# Patient Record
Sex: FEMALE
[De-identification: names, ages, dates, MRNs, and addresses within clinical notes are randomized per-mention and may not be internally consistent; named-entity substitution may affect disease eponyms.]

---

## 2022-05-06 PROBLEM — Z00.00 ENCOUNTER FOR PREVENTIVE HEALTH EXAMINATION: Status: ACTIVE | Noted: 2022-05-06

## 2022-06-10 ENCOUNTER — APPOINTMENT (OUTPATIENT)
Dept: PLASTIC SURGERY | Facility: CLINIC | Age: 27
End: 2022-06-10

## 2023-05-24 ENCOUNTER — APPOINTMENT (OUTPATIENT)
Dept: PLASTIC SURGERY | Facility: CLINIC | Age: 28
End: 2023-05-24

## 2023-08-23 ENCOUNTER — APPOINTMENT (OUTPATIENT)
Dept: PLASTIC SURGERY | Facility: CLINIC | Age: 28
End: 2023-08-23
Payer: COMMERCIAL

## 2023-08-23 DIAGNOSIS — Z78.9 OTHER SPECIFIED HEALTH STATUS: ICD-10-CM

## 2023-08-23 DIAGNOSIS — B20 HUMAN IMMUNODEFICIENCY VIRUS [HIV] DISEASE: ICD-10-CM

## 2023-08-23 DIAGNOSIS — F64.9 GENDER IDENTITY DISORDER, UNSPECIFIED: ICD-10-CM

## 2023-08-23 PROCEDURE — 99203 OFFICE O/P NEW LOW 30 MIN: CPT

## 2023-08-24 NOTE — DISCUSSION/SUMMARY
[FreeTextEntry1] : This reginaldo patient began transitioning in 2011. She has been on hormonal therapy consistently since 2014. She has been in therapy and was diagnosed with Gender Dysphoria concerned about certain facial features that appear masculine and cause bullying desires facial feminization surgery followed by Transgender team. The following facial features appear masculine and will need to be modified: -Nose revision -cheeks  + FH: noncontributory   Allergies: -No  Meds: - Estrogen (injection) - Biktarvy - Spironolactone  PMHx: HIV+   Surgical History Surgery Type: Facial feminization surgery (nose, cheek implants, jawline, chin, brow). Location: [facility] Surgeon: Lisette Year: 2022 Complications?: none  Surgery type: Breast augmentation Location: [facility] Surgeon: Pranav Albert Year: 2022 Complications: None  Surgery type: Bondy contouring (lipo to hips and buttocks) Location: Private practice Surgeon: Dr. Sánchez Year: 2023 Complications: none  Denies previous botox, fillers or silicone injections.   SH: Denies marijuana use, Denies cigarette use. Patient admits to Hookah use everyday 6-7x a day.  ROS: General health / Constitutional: no fever, no chills, no unusual weight changes, no energy level changes, no night sweats  Skin: No color or pigmentation changes, no pruritis, no rashes, no ulcers,  Hair: No changes in color, texture, distribution, loss  Nails: No color changes, brittleness, infection  Head: No headaches, no new jaw pain  Eyes: Good visual acuity, no color blindness, no corrective lenses, no photophobia, no diplopia, no blurred vision, no infection, pain, no medications,  Ear: no tinnitus, no discharge, no pain, no medications  Nose: no epistaxis, no rhinorrhea, no rhinitis, no pain,  Mouth & Throat: no gingivitis, no gingival bleeding, no ulcers, no voice changes, no changes in oral mucosa or tongue Neck no stiffness, no pain, no lymphadenitis, no thyroid disorders,  Respiratory: no cough, no dyspnea, no wheezing, no chest pain, cyanosis, no pneumonia, no asthma,  Cardiovascular: no chest pain, no palpitations, no irregular rhythm, no tachycardia, no bradycardia, no heart failure, no dyspnea on exertion (MEIER), no edema  Gastrointestinal: no nausea / vomiting, no dysphagia, no reflux / GERD, no abdominal pain, no jaundice  Musculoskeletal: no pain in muscles, bones, or joints; no fractures, no dislocations, no muscular weakness, no atrophy  Neurological: no paresis, no paralysis, no paresthesia, no seizures, no dizziness, no syncope, no ataxia, no tremor  Psychological: no childhood behavioral problems, no irritability, no sleep changes  Hematological: no anemia, no bruising, no bleeding tendencies,   PHYSICAL EXAM  General: WDWN, in no distress, A & O x 3 (person, place, time)  HEENT Head: AT/NC (atraumatic, normocephalic), including TMJ, sensory and motor;  Eyes: EOMI, PERRLA  Ears: exterior, nl hearing,  Nose: + slightly wide, bulbous nasal tip with acute nasolabial angle intranasal exam showed enlarged turbinates and deviated caudal septum  Throat & mouth: gd palate elevation, nl tongue mobility, nl tonsil size; +Slightly prominent mandibular angle with active masseter, boxy wide chin, +ptotic cheeks, borders of cheek implants felt on palpation.  Neck: no masses, nl pulses, no prominent tracheal bulge ("Javier's Apple")   Despite having nose surgery during her initial facial feminization surgery, she still has a nose that is asymmetric, bulbous and wide with poor tip support which continues to create a masculine appearance. To alleviate these concerns and to create a feminine appearance that will help alleviate her feelings of dysphoria, we recommend a nose revision, which will promote a more feminine appearance.  We had a 25 minute meeting with the patient discussing diagnosis and medical management issues and outcomes.   First stage FFS:  06128: Rhinoplasty revision open 20912: Cartilage grafting for nasal reconstruction, use of cadaveric cartilage for  grafts, columellar strut, tip graft 21270-50: Bilateral cheek implants 84052: Fat grafting malar 20525-50: Foreign body removal  26729 (Revision Rhinoplasty): This patients nose has characteristics of a male nose. The male nose is often larger and wider with a more bulbous nasal tip. These male nasal features make her feel masculine which exacerbates her gender dysphoria. A rhinoplasty would be beneficial to feminize her nose by creating a smaller nose and more delicate, softer nasal tip. The lateral dorsal shape will also be feminized by the rhinoplasty.         20912 (Cartilage grafting for nasal reconstruction): Cartilage grafting is crucial for the performance of the feminizing rhinoplasty. This patient has an ethnic type of nose. With regards to her nose, she wants to keep true to her ethnicity while appearing more feminine. To do that cartilage grafts including, bilateral  grafts, a columellar strut graft, a dorsal onlay graft, and nasal tip graft are all necessary.  The dorsal onlay graft will raise the nasal radix and improve the frontonasal regional shape.         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 More fullness in the cheek region may be created with fat grafting from the abdomen or hips to the cheek region creating a more full, feminine appearance. The Waterman fat grafting technique with atraumatic harvest and grafting leads to a 70%+ take of fat grafting to this region and is suggested. 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 She would benefit from implants lateral to the nose on both sides to establish a bruce appearance that softens the face so it will look less harsh.  20525-50 (Foreign body removal): Patient previously had cheek implants during her initial facial feminization surgery and these implants cause her cheeks to appear ptotic, worsening her gender dysphoria. The borders of these cheek implants are also felt on palpation. This patient would benefit from removal of the previous cheek implants and implantation of new ones that correct this ptotic appearance.   Needs a 3D CT scan and Virtual Surgical Planning. She will need to provide a letter from her therapist and hormone provider. Will need PCP clearance.  Patient seen in conjunction with Dr. Manfred Zurita.

## 2023-08-28 VITALS — WEIGHT: 212 LBS | HEIGHT: 66 IN | BODY MASS INDEX: 34.07 KG/M2

## 2023-08-28 PROBLEM — Z78.9 CURRENT NON-SMOKER: Status: ACTIVE | Noted: 2023-08-28

## 2023-08-28 PROBLEM — B20 HIV (HUMAN IMMUNODEFICIENCY VIRUS INFECTION): Status: ACTIVE | Noted: 2023-08-28

## 2023-08-28 RX ORDER — SPIRONOLACTONE 50 MG/1
TABLET ORAL
Refills: 0 | Status: ACTIVE | COMMUNITY

## 2023-08-28 RX ORDER — BICTEGRAVIR SODIUM, EMTRICITABINE, AND TENOFOVIR ALAFENAMIDE FUMARATE 30; 120; 15 MG/1; MG/1; MG/1
TABLET ORAL
Refills: 0 | Status: ACTIVE | COMMUNITY

## 2023-08-28 RX ORDER — ESTRADIOL VALERATE 40 MG/ML
40 INJECTION INTRAMUSCULAR
Refills: 0 | Status: ACTIVE | COMMUNITY